# Patient Record
Sex: FEMALE | Race: WHITE | NOT HISPANIC OR LATINO | Employment: FULL TIME | URBAN - METROPOLITAN AREA
[De-identification: names, ages, dates, MRNs, and addresses within clinical notes are randomized per-mention and may not be internally consistent; named-entity substitution may affect disease eponyms.]

---

## 2023-07-21 ENCOUNTER — LAB (OUTPATIENT)
Dept: LAB | Facility: LAB | Age: 28
End: 2023-07-21
Payer: COMMERCIAL

## 2023-07-21 LAB — TOBACCO SCREEN, URINE: NEGATIVE

## 2023-11-17 ENCOUNTER — PHARMACY VISIT (OUTPATIENT)
Dept: PHARMACY | Facility: CLINIC | Age: 28
End: 2023-11-17
Payer: COMMERCIAL

## 2023-11-17 PROCEDURE — RXMED WILLOW AMBULATORY MEDICATION CHARGE

## 2023-11-17 RX ORDER — SPIRONOLACTONE 100 MG/1
100 TABLET, FILM COATED ORAL
Qty: 90 TABLET | Refills: 0 | OUTPATIENT
Start: 2022-12-09 | End: 2024-02-12 | Stop reason: SINTOL

## 2024-02-12 ENCOUNTER — OFFICE VISIT (OUTPATIENT)
Dept: PRIMARY CARE | Facility: CLINIC | Age: 29
End: 2024-02-12
Payer: COMMERCIAL

## 2024-02-12 VITALS
HEART RATE: 88 BPM | WEIGHT: 240.7 LBS | DIASTOLIC BLOOD PRESSURE: 88 MMHG | BODY MASS INDEX: 35.65 KG/M2 | SYSTOLIC BLOOD PRESSURE: 138 MMHG | TEMPERATURE: 98.2 F | OXYGEN SATURATION: 96 % | HEIGHT: 69 IN

## 2024-02-12 DIAGNOSIS — R06.81 APNEA: ICD-10-CM

## 2024-02-12 DIAGNOSIS — G47.10 HYPERSOMNOLENCE: ICD-10-CM

## 2024-02-12 DIAGNOSIS — E88.819 INSULIN RESISTANCE: ICD-10-CM

## 2024-02-12 DIAGNOSIS — Z00.00 WELL ADULT EXAM: Primary | ICD-10-CM

## 2024-02-12 DIAGNOSIS — E66.9 CLASS 2 OBESITY WITHOUT SERIOUS COMORBIDITY WITH BODY MASS INDEX (BMI) OF 35.0 TO 35.9 IN ADULT, UNSPECIFIED OBESITY TYPE: ICD-10-CM

## 2024-02-12 PROCEDURE — 3008F BODY MASS INDEX DOCD: CPT | Performed by: FAMILY MEDICINE

## 2024-02-12 PROCEDURE — 99203 OFFICE O/P NEW LOW 30 MIN: CPT | Performed by: FAMILY MEDICINE

## 2024-02-12 PROCEDURE — 1036F TOBACCO NON-USER: CPT | Performed by: FAMILY MEDICINE

## 2024-02-12 ASSESSMENT — PATIENT HEALTH QUESTIONNAIRE - PHQ9
SUM OF ALL RESPONSES TO PHQ9 QUESTIONS 1 AND 2: 0
2. FEELING DOWN, DEPRESSED OR HOPELESS: NOT AT ALL
1. LITTLE INTEREST OR PLEASURE IN DOING THINGS: NOT AT ALL

## 2024-02-12 NOTE — PROGRESS NOTES
"Subjective   Patient ID: Lita Oconnor is a 28 y.o. female who presents for New Patient Visit.    HPI     Patient states that she would like to establish care.     She also states she has a few general questions regarding health.    She would like a referral to see a dietician.     Pt was seen by GYN and started on Metformin and spironolactone for suspected PCOS and androgen excess.    Pt has been expeiencing some daytime hypersomnia.  (+) snoring.  (+) witnessed apnea at times.    Review of Systems  Constitutional: Patient denies any fever, chills, loss of appetite, or unexplained weight loss.  Cardiovascular: Patient denies any chest pain, shortness of breath with exertion, tachycardia, palpitations, orthopnea, or paroxysmal nocturnal dyspnea.  Respiratory: Patient denies any cough, shortness breath, or wheezing.  Gastrointestinal: Patient denies any nausea, vomiting, diarrhea, constipation, melena, hematochezia, or reflux symptoms.  Skin: Denies any rashes or skin lesions.   Neurology: Patient denies any new motor or sensory losses.  Denies any numbness, tingling, weakness, and incoordination of the extremities.  Patient also denies any tremor, seizures, or gait instability.  Endocrinology: Denies any polyuria, polydipsia, polyphagia, or heat/cold intolerance.      Objective   /88   Pulse 88   Temp 36.8 °C (98.2 °F)   Ht 1.753 m (5' 9\")   Wt 109 kg (240 lb 11.2 oz)   SpO2 96%   BMI 35.55 kg/m²     Physical Exam  General Appearance: Alert and cooperative, in no acute distress, well-developed/well-nourished, overweight female.    Neck: Supple and without adenopathy or rigidity.  There is no JVD at 90° and no carotid bruits are noted.  There is no thyromegaly, thyroid tenderness, or palpable thyroid nodules.  Heart: Regular rate and rhythm without murmur or ectopy.  Respiratory: Lungs are clear to auscultation bilaterally with good air exchange.  Good respiratory effort and no accessory muscle " use.  Skin: Good turgor, moist, warm and without rashes or lesions.  Neurological exam: Alert and oriented ×3, no tremor, normal gait.  Extremities: No clubbing, cyanosis, or edema    ENT: Mucous membranes are moist, external auditory canals and tympanic membranes are within normal limits bilaterally.  Pharynx is without erythema or exudate.  Pt has large tonsils on exam.    Assessment/Plan   1. Well adult exam  Appropriate screenings for the patient's current age were discussed at length.  I encouraged a low-fat/low-cholesterol diet and routine exercise.  - CBC and Auto Differential; Future  - Comprehensive Metabolic Panel; Future  - Lipid Panel; Future    2. Hypersomnolence  Sleep study ordered to further evaluate.  - In-Center Sleep Study (Non-Sleep Provider Only); Future    3. Apnea  Sleep study ordered to further evaluate.  High suspicion for sleep apnea given her symptoms and large tonsils.  - In-Center Sleep Study (Non-Sleep Provider Only); Future    4. Insulin resistance  Labs were ordered to further evaluate for insulin resistance.  - Hemoglobin A1C; Future  - TSH with reflex to Free T4 if abnormal; Future    5. Class 2 obesity without serious comorbidity with body mass index (BMI) of 35.0 to 35.9 in adult, unspecified obesity type  We will refer her to nutrition services.  We will also check a thyroid level for her.  - Referral to Nutrition Services; Future  - TSH with reflex to Free T4 if abnormal; Future           Orders Placed This Encounter   Procedures    CBC and Auto Differential    Comprehensive Metabolic Panel    Hemoglobin A1C    Lipid Panel    TSH with reflex to Free T4 if abnormal    Referral to Nutrition Services    In-Center Sleep Study (Non-Sleep Provider Only)

## 2024-02-12 NOTE — PATIENT INSTRUCTIONS
Follow up in 12 months.    It was a pleasure to see you today. Thank you for choosing us for your health care needs.    If you have lab or other testing completed and have not been informed of results within one week, please call the office for your results.    If you haven't done so, consider signing up for [facility] [employer], the [facility] personal health record. Ask the staff how you can get started.

## 2024-03-13 ENCOUNTER — NUTRITION (OUTPATIENT)
Dept: PRIMARY CARE | Facility: CLINIC | Age: 29
End: 2024-03-13
Payer: COMMERCIAL

## 2024-03-13 DIAGNOSIS — E66.9 CLASS 2 OBESITY WITHOUT SERIOUS COMORBIDITY WITH BODY MASS INDEX (BMI) OF 35.0 TO 35.9 IN ADULT, UNSPECIFIED OBESITY TYPE: Primary | ICD-10-CM

## 2024-03-13 PROBLEM — E66.812 CLASS 2 OBESITY WITHOUT SERIOUS COMORBIDITY WITH BODY MASS INDEX (BMI) OF 35.0 TO 35.9 IN ADULT: Status: ACTIVE | Noted: 2024-03-13

## 2024-03-13 PROCEDURE — 97802 MEDICAL NUTRITION INDIV IN: CPT | Performed by: DIETITIAN, REGISTERED

## 2024-03-13 NOTE — PROGRESS NOTES
Reason for Nutrition Visit:  Pt is a 28 y.o. female being seen at [facility] in [place] referred for   1. Class 2 obesity without serious comorbidity with body mass index (BMI) of 35.0 to 35.9 in adult, unspecified obesity type           Past Medical History:  Patient Active Problem List   Diagnosis    Class 2 obesity without serious comorbidity with body mass index (BMI) of 35.0 to 35.9 in adult      Weight history:  240# (2/2024)  237# (12/2022)    Significant Weight Change: No    Lab Results   Component Value Date    HGBA1C 5.6 11/30/2022      Food and Nutrition History:  She is here to discuss healthier eating.   She has possible PCOS, but not able to be confirmed because she has a birth control implant that influences her menstrual cycle. Is on metformin.   She works for [employer]. Works 12 hour shifts from 7a to 7p, 1 hour commute, [employer] respiratory therapy.    Typical Food Intake:   Breakfast:  6 a.m., in the car on workdays, a little later if not working. Coffee with [employer]ani creamer. Kodiac waffles with PB or yogurt, or sometimes a breakfast sandwich from [employer], or [employer]. Sometimes fruit (grapes and apples). Feels mildly hungry in the morning.   Morning Snack: 11, such as food available at work (baked goods), has been packing her own snacks in the past 2 weeks: produce or nuts, or crackers & cheese. At home, no snacking in the morning.   Lunch: 1 pm or after - Mediterranean food from work - power bowls. Or brings leftovers from home. Typically eats lunch, doesn't typically miss it.   Afternoon Snack: sometimes has a snack, especially if she didn't eat her morning snack. At home, no afternoon snack.    Dinner: 8:45-9 pm on workdays, likes to eat with her boyfriend. Dines out 1-2 nights per week. Makes salmon (weekly or every other week), or pot roast in crock pot, or chicken caesar wraps.   Beverages: coffee. No pop. Drinks hot & cold tea. Occasionally lemonade. EtOH - socially.     Food Preparation:  Patient and Partner/Spouse  Cooking Skills/Barriers: Low preference for cooking, her boyfriend likes to cook more than her.   Grocery Shopping: Patient, shops for about 10 days of groceries, then is out for about 4 days (more prone to dining out at that time).   No difficulty affording food  Dining out: Restaurant     Food Allergies: None  Food Intolerance: Italian sausage sometimes  GI Symptoms : None   Swallowing Difficulty: No problems with swallowing  Chewing: no problems chewing    Exercise:   Types of Activities: started going to the gym in the past month and is seeing a . Previously, was walking outside. Goes to the gym 2-3 times per week. Uses machines, and treadmill interval program, and abdominal exercises. Likes the program, feels that it is sustainable.   Did competative cheerleading when she was younger.   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     Dietary Supplements: Denies     Relationship with Food:   Appetite: Good    Nutrition Focused Physical Exam:  Performed/Deferred: Deferred as pt visually appears well-nourished with no signs of malnutrition    Estimated Nutrient Needs:    Weight Maintanence: 2384 kcal/day  Weight Loss Needs: 2717-1665 kcal/day,  gm protein/day, 0359-1324 ml/day    Nutrition Diagnosis:    Diagnosis Statement 1:  Diagnosis Status: New  Diagnosis : Food and nutrition related knowledge deficit related to lack of or limited prior nutrition-related education as evidenced by  she has questions about how to make changes to her diet for healthier eating.     Nutrition Interventions:  Via teach back method patient verbalized understanding of the following topics:  Nutrition education on the following diet topic(s): Plate Method, Pairing Food Groups, and Timing of Meals  Nutrition counseling using the following strategy: Nutrition Counseling: Goal Setting  Coordination of Care: None    Education:   Encouraged patient to  pursue balanced eating.   Eat the first meal of the day within about 2 hours of waking up. If that seems too early, then be attentive to the first time of the day that there are signs of hunger appearing, and respond to those hunger signals by eating a meal or snack.   Eat food at least every 5 hours. If it will be longer than 5 hours between meals, consider adding a snack between the meals. Eating at regular intervals can help prevent becoming overly hungry.   Use the Plate Method to balance the type of food and amount of foods on the plate.   At snacks, include a food that is a rich source of protein, and include a food from a second food group. Eating food from 2 food groups at a snack can promote more satisfaction than eating a snack that is very small. A snack should be satisfying and help diminish thoughts of hunger until the next meal.   Spend some time each week planning out what to eat, shop for groceries, and do a little food preparation. Try to create meals that will yield leftovers to save time at another meal. Planning can be one of the most helpful skills for promoting balanced eating.    Strive to make pleasure part of eating healthy. Also strive for a good relationship with food. This shouldn't be a temporary diet that can only be maintained with strict willpower. Be curious about what tastes good to you and what foods feel good in your body. No food is inherently good or bad, and eating shouldn't promote negative feelings about food. Be kind to yourself as you work on balanced eating.    Utilize the Plate Method at meals in order to balance meals.   - 1/3-1/2 of the plate full of non-starchy vegetables. These foods contribute very little carbohydrate to the plate, and they add fiber to the meal. Salad, carrots, bell peppers, zucchini, broccoli, cauliflower, asparagus, radishes, carrots and green beans are a few examples of these foods. They can be served cooked or raw.    - 1/4-1/3 of the plate  "including protein foods. Examples can include meat, nuts, seeds, cheese, cottage cheese, nut butter, fish, seafood, tofu, and edamame.     - 1/4-1/3 of the plate including carbohydrate foods. These foods include grains, fruit, legumes, starchy vegetables, milk and yogurt. Aim to eat at least half of the daily grains as whole grains.    Introduced patient to using a scale of 1-10 to rate hunger/satiety. Reviewed signs of hunger that patient might or might not feel, and encouraged being attentive to notice these signals if they are present. Encouraged patient to honor hunger by eating when feeling at a \"3\" instead of waiting for hunger to become more severe. Encouraged using this method in conjunction with balanced foods on the plate, using the Plate Method.     Advised there is no dietary treatment for PCOS, but eating complex carbohydrates, meals at regular intervals through the day, balanced plates of food, and ideally eating foods more similar to a Mediterranean-style eating pattern are some ways that nutrition can be optimized with PCOS.     Monitoring & Evaluation:  Monitoring & Evaluation: Amount of Food - Estimated, Types of Food, Meal/Snack Pattern - Estimated, and Physical Activity    Nutrition Goals:  She will consume a morning snack and afternoon snack in order to keep portions at lunch & dinner to 1 plate  She will use the Plate Method at meals to balance types & amounts of food  She will keep up her exercise habit      Handouts:  ADA Plate Method, 33% Plate Method, [facility] Balanced Breakfast, [facility] Love Your Lunch, High Protein Snack Ideas, and High Protein Meal Ideas    Follow up Plan:   PRN, patient will call to schedule follow up if desired, suggested 1-2 months if she would like to meet again    Readiness to Change : Good  Level of Understanding : Good  Anticipated Compliant : Good   "

## 2024-03-13 NOTE — PATIENT INSTRUCTIONS
Encouraged patient to pursue balanced eating.   Eat the first meal of the day within about 2 hours of waking up. If that seems too early, then be attentive to the first time of the day that there are signs of hunger appearing, and respond to those hunger signals by eating a meal or snack.   Eat food at least every 5 hours. If it will be longer than 5 hours between meals, consider adding a snack between the meals. Eating at regular intervals can help prevent becoming overly hungry.   Use the Plate Method to balance the type of food and amount of foods on the plate.   At snacks, include a food that is a rich source of protein, and include a food from a second food group. Eating food from 2 food groups at a snack can promote more satisfaction than eating a snack that is very small. A snack should be satisfying and help diminish thoughts of hunger until the next meal.   Spend some time each week planning out what to eat, shop for groceries, and do a little food preparation. Try to create meals that will yield leftovers to save time at another meal. Planning can be one of the most helpful skills for promoting balanced eating.    Strive to make pleasure part of eating healthy. Also strive for a good relationship with food. This shouldn't be a temporary diet that can only be maintained with strict willpower. Be curious about what tastes good to you and what foods feel good in your body. No food is inherently good or bad, and eating shouldn't promote negative feelings about food. Be kind to yourself as you work on balanced eating.    Utilize the Plate Method at meals in order to balance meals.   - 1/3-1/2 of the plate full of non-starchy vegetables. These foods contribute very little carbohydrate to the plate, and they add fiber to the meal. Salad, carrots, bell peppers, zucchini, broccoli, cauliflower, asparagus, radishes, carrots and green beans are a few examples of these foods. They can be served cooked or raw.    -  "1/4-1/3 of the plate including protein foods. Examples can include meat, nuts, seeds, cheese, cottage cheese, nut butter, fish, seafood, tofu, and edamame.     - 1/4-1/3 of the plate including carbohydrate foods. These foods include grains, fruit, legumes, starchy vegetables, milk and yogurt. Aim to eat at least half of the daily grains as whole grains.    Introduced patient to using a scale of 1-10 to rate hunger/satiety. Reviewed signs of hunger that patient might or might not feel, and encouraged being attentive to notice these signals if they are present. Encouraged patient to honor hunger by eating when feeling at a \"3\" instead of waiting for hunger to become more severe. Encouraged using this method in conjunction with balanced foods on the plate, using the Plate Method.     Advised there is no dietary treatment for PCOS, but eating complex carbohydrates, meals at regular intervals through the day, balanced plates of food, and ideally eating foods more similar to a Mediterranean-style eating pattern are some ways that nutrition can be optimized with PCOS.     "

## 2024-03-19 ENCOUNTER — APPOINTMENT (OUTPATIENT)
Dept: PRIMARY CARE | Facility: CLINIC | Age: 29
End: 2024-03-19
Payer: COMMERCIAL

## 2024-05-07 ENCOUNTER — OFFICE VISIT (OUTPATIENT)
Dept: DERMATOLOGY | Facility: CLINIC | Age: 29
End: 2024-05-07
Payer: COMMERCIAL

## 2024-05-07 DIAGNOSIS — D22.9 MULTIPLE BENIGN MELANOCYTIC NEVI: Primary | ICD-10-CM

## 2024-05-07 DIAGNOSIS — L57.8 SUN-DAMAGED SKIN: ICD-10-CM

## 2024-05-07 DIAGNOSIS — L90.5 SCAR CONDITIONS AND FIBROSIS OF SKIN: ICD-10-CM

## 2024-05-07 PROCEDURE — 3008F BODY MASS INDEX DOCD: CPT | Performed by: DERMATOLOGY

## 2024-05-07 PROCEDURE — 99203 OFFICE O/P NEW LOW 30 MIN: CPT | Performed by: DERMATOLOGY

## 2024-05-07 ASSESSMENT — DERMATOLOGY QUALITY OF LIFE (QOL) ASSESSMENT
RATE HOW BOTHERED YOU ARE BY SYMPTOMS OF YOUR SKIN PROBLEM (EG, ITCHING, STINGING BURNING, HURTING OR SKIN IRRITATION): 0 - NEVER BOTHERED
DATE THE QUALITY-OF-LIFE ASSESSMENT WAS COMPLETED: 66967
ARE THERE EXCLUSIONS OR EXCEPTIONS FOR THE QUALITY OF LIFE ASSESSMENT: NO
RATE HOW BOTHERED YOU ARE BY EFFECTS OF YOUR SKIN PROBLEMS ON YOUR ACTIVITIES (EG, GOING OUT, ACCOMPLISHING WHAT YOU WANT, WORK ACTIVITIES OR YOUR RELATIONSHIPS WITH OTHERS): 0 - NEVER BOTHERED
RATE HOW EMOTIONALLY BOTHERED YOU ARE BY YOUR SKIN PROBLEM (FOR EXAMPLE, WORRY, EMBARRASSMENT, FRUSTRATION): 0 - NEVER BOTHERED

## 2024-05-07 ASSESSMENT — DERMATOLOGY PATIENT ASSESSMENT
HAVE YOU HAD OR DO YOU HAVE A STAPH INFECTION: NO
DO YOU HAVE ANY NEW OR CHANGING LESIONS: NO
DO YOU HAVE IRREGULAR MENSTRUAL CYCLES: NO
ARE YOU TRYING TO GET PREGNANT: NO
DO YOU USE A TANNING BED: YES, PREVIOUSLY
HAVE YOU HAD OR DO YOU HAVE VASCULAR DISEASE: NO
ARE YOU AN ORGAN TRANSPLANT RECIPIENT: NO
ARE YOU ON BIRTH CONTROL: YES
DO YOU USE SUNSCREEN: DAILY

## 2024-05-07 ASSESSMENT — PATIENT GLOBAL ASSESSMENT (PGA): PATIENT GLOBAL ASSESSMENT: PATIENT GLOBAL ASSESSMENT:  1 - CLEAR

## 2024-05-07 ASSESSMENT — ITCH NUMERIC RATING SCALE: HOW SEVERE IS YOUR ITCHING?: 0

## 2024-05-07 NOTE — PROGRESS NOTES
Subjective     Lita Oconnor is a 28 y.o. female who presents for the following: Skin Check (Redness, tenderness of nose for past month. She did get sunburn in [place]. /No personal or family history of skin cancer.  She reports having a lot of moles.).     Skin Cancer History  No skin cancer on file.      Review of Systems:  No other skin or systemic complaints other than what is documented elsewhere in the note.    The following portions of the chart were reviewed this encounter and updated as appropriate:       Specialty Problems    None    Past Medical History:  Lita Oconnor  has no past medical history on file.    Past Surgical History:  Lita Oconnor  has a past surgical history that includes Other surgical history (Left, 2013).    Family History:  Patient family history includes Coronary artery disease in her father and paternal grandfather; Diabetes in her father; Heart attack in her paternal grandfather; Heart disease in her maternal grandfather; No Known Problems in her mother; Thyroid issues in her father.    Social History:  Lita Oconnor  reports that she has never smoked. She has never used smokeless tobacco. She reports current alcohol use. She reports that she does not use drugs.    Allergies:  Spironolactone    Current Medications / CAM's:    Current Outpatient Medications:     etonogestrel (NEXPLANON SDRM), Inject under the skin., Disp: , Rfl:     metFORMIN XR (Glucophage-XR) 500 mg 24 hr tablet, Take 2 tablets (1,000 mg) by mouth once daily., Disp: 60 tablet, Rfl: 5     Objective   Well appearing patient in no apparent distress; mood and affect are within normal limits.    A full examination was performed including scalp, head, eyes, ears, nose, lips, neck, chest, axillae, abdomen, back, buttocks, bilateral upper extremities, bilateral lower extremities, hands, feet, fingers, toes, fingernails, and toenails. All findings within normal limits unless otherwise noted below. Patient  declined genital and gluteal cleft exam.  Patient has fingernail polish in place.      - scattered regular brown macules and papules    - Well healed scar at prior treatment sites without visual or palpable evidence of recurrence.     - scattered tan macules, telangiectasias, and general photo-damage         Assessment/Plan   Multiple benign melanocytic nevi    Benign melanocytic nevi  - Discussed benign nature and that no treatment is necessary unless it becomes painful or increases in size. Patient opts for clinical monitoring at this time.    - Sun protective behavior reviewed and encouraged including the use of over-the-counter sunscreen with SPF30+ daily (reapply every 1.5 hours when outdoors), UPF clothing, broad rimmed hats, sunglasses, and avoidance of midday sun. Home skin monitoring encouraged and how to monitor for skin cancer (changing or new moles, new rapidly growing or non-healing lesions) reviewed. Patient encouraged to call with interval concerns or changes.      Scar conditions and fibrosis of skin    - Well healed scar(s) at sites of prior left shoulder surgery   - Sun protective behavior reviewed and encouraged including the use of over-the-counter sunscreen with SPF30+ daily (reapply every 1.5 hours when outdoors), UPF clothing, broad rimmed hats, sunglasses, and avoidance of midday sun. Home skin monitoring encouraged and how to monitor for skin cancer (changing or new moles, new rapidly growing or non-healing lesions) reviewed. Patient encouraged to call with interval concerns or changes.       Sun-damaged skin    Actinically damaged skin- mild telangiectasias and prominent pores on nose as site of prior sunburn with sensitivity, no signs of malignancy on visual or palpation exam  Tan lines on back.   - Sun protective behavior reviewed and encouraged including the use of over-the-counter sunscreen with SPF30+ daily (reapply every 1.5 hours when outdoors), UPF clothing, broad rimmed hats,  sunglasses, and avoidance of midday sun. Home skin monitoring encouraged and how to monitor for skin cancer (changing or new moles, new rapidly growing or non-healing lesions) reviewed. Patient encouraged to call with interval concerns or changes.         Given her numerous moles > 100, but NOT clinically dysplastic I recommend FBSE every 2-3 years, sooner with changes/concerns.     Kay Jaime MD

## 2024-06-05 PROBLEM — Z97.5 NEXPLANON IN PLACE: Status: ACTIVE | Noted: 2020-02-24

## 2024-06-05 PROBLEM — L30.9 DERMATITIS: Status: ACTIVE | Noted: 2024-06-05

## 2024-08-02 ENCOUNTER — LAB (OUTPATIENT)
Dept: LAB | Facility: LAB | Age: 29
End: 2024-08-02
Payer: COMMERCIAL

## 2024-08-02 DIAGNOSIS — Z00.00 WELL ADULT EXAM: ICD-10-CM

## 2024-08-02 DIAGNOSIS — E88.819 INSULIN RESISTANCE: ICD-10-CM

## 2024-08-02 DIAGNOSIS — E66.9 CLASS 2 OBESITY WITHOUT SERIOUS COMORBIDITY WITH BODY MASS INDEX (BMI) OF 35.0 TO 35.9 IN ADULT, UNSPECIFIED OBESITY TYPE: ICD-10-CM

## 2024-08-02 LAB
ALBUMIN SERPL BCP-MCNC: 4.5 G/DL (ref 3.4–5)
ALP SERPL-CCNC: 64 U/L (ref 33–110)
ALT SERPL W P-5'-P-CCNC: 15 U/L (ref 7–45)
ANION GAP SERPL CALC-SCNC: 12 MMOL/L (ref 10–20)
AST SERPL W P-5'-P-CCNC: 12 U/L (ref 9–39)
BASOPHILS # BLD AUTO: 0.06 X10*3/UL (ref 0–0.1)
BASOPHILS NFR BLD AUTO: 0.7 %
BILIRUB SERPL-MCNC: 0.6 MG/DL (ref 0–1.2)
BUN SERPL-MCNC: 13 MG/DL (ref 6–23)
CALCIUM SERPL-MCNC: 9.3 MG/DL (ref 8.6–10.3)
CHLORIDE SERPL-SCNC: 105 MMOL/L (ref 98–107)
CHOLEST SERPL-MCNC: 201 MG/DL (ref 0–199)
CHOLESTEROL/HDL RATIO: 4.4
CO2 SERPL-SCNC: 25 MMOL/L (ref 21–32)
CREAT SERPL-MCNC: 0.82 MG/DL (ref 0.5–1.05)
EGFRCR SERPLBLD CKD-EPI 2021: >90 ML/MIN/1.73M*2
EOSINOPHIL # BLD AUTO: 0.27 X10*3/UL (ref 0–0.7)
EOSINOPHIL NFR BLD AUTO: 3 %
ERYTHROCYTE [DISTWIDTH] IN BLOOD BY AUTOMATED COUNT: 12.7 % (ref 11.5–14.5)
GLUCOSE SERPL-MCNC: 85 MG/DL (ref 74–99)
HCT VFR BLD AUTO: 42.1 % (ref 36–46)
HDLC SERPL-MCNC: 45.3 MG/DL
HGB BLD-MCNC: 13.7 G/DL (ref 12–16)
IMM GRANULOCYTES # BLD AUTO: 0.03 X10*3/UL (ref 0–0.7)
IMM GRANULOCYTES NFR BLD AUTO: 0.3 % (ref 0–0.9)
LDLC SERPL CALC-MCNC: 134 MG/DL
LYMPHOCYTES # BLD AUTO: 2.71 X10*3/UL (ref 1.2–4.8)
LYMPHOCYTES NFR BLD AUTO: 29.7 %
MCH RBC QN AUTO: 30 PG (ref 26–34)
MCHC RBC AUTO-ENTMCNC: 32.5 G/DL (ref 32–36)
MCV RBC AUTO: 92 FL (ref 80–100)
MONOCYTES # BLD AUTO: 0.72 X10*3/UL (ref 0.1–1)
MONOCYTES NFR BLD AUTO: 7.9 %
NEUTROPHILS # BLD AUTO: 5.34 X10*3/UL (ref 1.2–7.7)
NEUTROPHILS NFR BLD AUTO: 58.4 %
NON HDL CHOLESTEROL: 156 MG/DL (ref 0–149)
NRBC BLD-RTO: 0 /100 WBCS (ref 0–0)
PLATELET # BLD AUTO: 352 X10*3/UL (ref 150–450)
POTASSIUM SERPL-SCNC: 4.3 MMOL/L (ref 3.5–5.3)
PROT SERPL-MCNC: 7 G/DL (ref 6.4–8.2)
RBC # BLD AUTO: 4.56 X10*6/UL (ref 4–5.2)
SODIUM SERPL-SCNC: 138 MMOL/L (ref 136–145)
TRIGL SERPL-MCNC: 107 MG/DL (ref 0–149)
TSH SERPL-ACNC: 0.99 MIU/L (ref 0.44–3.98)
VLDL: 21 MG/DL (ref 0–40)
WBC # BLD AUTO: 9.1 X10*3/UL (ref 4.4–11.3)

## 2024-08-02 PROCEDURE — 84443 ASSAY THYROID STIM HORMONE: CPT

## 2024-08-02 PROCEDURE — 85025 COMPLETE CBC W/AUTO DIFF WBC: CPT

## 2024-08-02 PROCEDURE — 83036 HEMOGLOBIN GLYCOSYLATED A1C: CPT

## 2024-08-02 PROCEDURE — 36415 COLL VENOUS BLD VENIPUNCTURE: CPT

## 2024-08-02 PROCEDURE — 80061 LIPID PANEL: CPT

## 2024-08-02 PROCEDURE — 80053 COMPREHEN METABOLIC PANEL: CPT

## 2024-08-03 LAB
EST. AVERAGE GLUCOSE BLD GHB EST-MCNC: 111 MG/DL
HBA1C MFR BLD: 5.5 %

## 2024-08-07 ENCOUNTER — TELEPHONE (OUTPATIENT)
Dept: PRIMARY CARE | Facility: CLINIC | Age: 29
End: 2024-08-07
Payer: COMMERCIAL

## 2024-08-17 ENCOUNTER — APPOINTMENT (OUTPATIENT)
Dept: PRIMARY CARE | Facility: CLINIC | Age: 29
End: 2024-08-17
Payer: COMMERCIAL

## 2024-08-24 ENCOUNTER — APPOINTMENT (OUTPATIENT)
Dept: PRIMARY CARE | Facility: CLINIC | Age: 29
End: 2024-08-24
Payer: COMMERCIAL

## 2024-08-24 VITALS
HEIGHT: 68 IN | OXYGEN SATURATION: 96 % | TEMPERATURE: 97.5 F | HEART RATE: 91 BPM | BODY MASS INDEX: 35.95 KG/M2 | WEIGHT: 237.2 LBS | DIASTOLIC BLOOD PRESSURE: 94 MMHG | SYSTOLIC BLOOD PRESSURE: 127 MMHG

## 2024-08-24 DIAGNOSIS — E66.9 CLASS 2 OBESITY WITHOUT SERIOUS COMORBIDITY WITH BODY MASS INDEX (BMI) OF 36.0 TO 36.9 IN ADULT, UNSPECIFIED OBESITY TYPE: Primary | ICD-10-CM

## 2024-08-24 PROBLEM — E88.819 INSULIN RESISTANCE: Status: ACTIVE | Noted: 2024-08-24

## 2024-08-24 PROBLEM — R63.5 WEIGHT GAIN: Status: ACTIVE | Noted: 2024-08-24

## 2024-08-24 PROBLEM — L68.0 FEMALE HIRSUTISM: Status: ACTIVE | Noted: 2024-08-24

## 2024-08-24 PROCEDURE — 3008F BODY MASS INDEX DOCD: CPT | Performed by: FAMILY MEDICINE

## 2024-08-24 PROCEDURE — 99214 OFFICE O/P EST MOD 30 MIN: CPT | Performed by: FAMILY MEDICINE

## 2024-08-24 RX ORDER — TIRZEPATIDE 2.5 MG/.5ML
2.5 INJECTION, SOLUTION SUBCUTANEOUS
Qty: 2 ML | Refills: 0 | Status: SHIPPED | OUTPATIENT
Start: 2024-08-25

## 2024-08-24 ASSESSMENT — PATIENT HEALTH QUESTIONNAIRE - PHQ9
SUM OF ALL RESPONSES TO PHQ9 QUESTIONS 1 AND 2: 0
1. LITTLE INTEREST OR PLEASURE IN DOING THINGS: NOT AT ALL
2. FEELING DOWN, DEPRESSED OR HOPELESS: NOT AT ALL

## 2024-08-24 NOTE — PROGRESS NOTES
"Subjective   Patient ID: Lita Oconnor is a 29 y.o. female who presents for Follow-up.    HPI     Patient states she would like to discuss weight loss options.  Pt has had difficulty achieving any meaningful long-term weight loss despite significant efforts at exercise and multiple different diets including caloric reduction.    No other concerns    Review of Systems  Constitutional: Patient denies any fever, chills, loss of appetite, or unexplained weight loss.  Cardiovascular: Patient denies any chest pain, shortness of breath with exertion, tachycardia, palpitations, orthopnea, or paroxysmal nocturnal dyspnea.  Respiratory: Patient denies any cough, shortness breath, or wheezing.  Gastrointestinal: Patient denies any nausea, vomiting, diarrhea, constipation, melena, hematochezia, or reflux symptoms.  Skin: Denies any rashes or skin lesions.   Neurology: Patient denies any new motor or sensory losses.  Denies any numbness, tingling, weakness, and incoordination of the extremities.  Patient also denies any tremor, seizures, or gait instability.  Endocrinology: Denies any polyuria, polydipsia, polyphagia, or heat/cold intolerance.    Objective   BP (!) 127/94   Pulse 91   Temp 36.4 °C (97.5 °F)   Ht 1.727 m (5' 8\")   Wt 108 kg (237 lb 3.2 oz)   SpO2 96%   BMI 36.07 kg/m²     Physical Exam  Gen. appearance: Alert and cooperative, no acute distress, well-developed/well-nourished, overweight female.  Neck: Supple and without adenopathy or rigidity.  There is no JVD at 90° and no carotid bruits are noted.  Cardiovascular: Heart has a regular rate and rhythm without murmur or ectopy.  Respiratory: Lungs are clear to auscultation bilaterally with good air exchange.  Good respiratory effort and no accessory muscle use.    Assessment/Plan   Assessment & Plan  Class 2 obesity without serious comorbidity with body mass index (BMI) of 36.0 to 36.9 in adult, unspecified obesity type  We had an extensive discussion " regarding available weight loss medications.   Risks, benefits, and side effects of the medication were discussed at length.  Questions were answered to the patient's satisfaction the patient wishes to proceed with treatment with Mounjaro if her insurance covers the medication.  We also discussed using local compounding pharmacy for subcutaneous semaglutide if the Mounjaro is not covered.    Orders:    tirzepatide (Mounjaro) 2.5 mg/0.5 mL pen injector; Inject 2.5 mg under the skin 1 (one) time per week.        Total time spent caring for the patient today was 34 minutes.  This includes time reviewing the chart, examining the patient, discussion with the patient, and documentation.

## 2024-08-24 NOTE — PATIENT INSTRUCTIONS
Rx for mounjaro was sent for you.    We can proceed with using the compounding pharmacy if the insurance does not cover the Mounjaro.    Follow up in 4 weeks to recheck the weight if we are able to start the medication.      It was a pleasure to see you today. Thank you for choosing us for your health care needs.    If you have lab or other testing completed and have not been informed of results within one week, please call the office for your results.    If you haven't done so, consider signing up for [facility] [employer], the [facility] personal health record. Ask the staff how you can get started.

## 2024-09-03 ENCOUNTER — TELEPHONE (OUTPATIENT)
Dept: PRIMARY CARE | Facility: CLINIC | Age: 29
End: 2024-09-03
Payer: COMMERCIAL

## 2024-09-03 NOTE — TELEPHONE ENCOUNTER
RX will be faxed. Form completed.    Pt should schedule a follow up appt in 4 weeks for follow up on the weight and medication use.

## 2024-09-03 NOTE — TELEPHONE ENCOUNTER
Pt advised that it was denied per insurance per diagnosis. I did tell her that she will need to see what other weight loss medication is approved by her insurance per Dr. Abraham request.     She did mention you suggest compound pharmacy as well: [facility] she stated that it is fine she'll go through them and pay cash.

## 2024-09-03 NOTE — TELEPHONE ENCOUNTER
Called pharmacy unfortunately her insurance will not cover it due to diagnosis not being diabetes.

## 2024-09-04 NOTE — ASSESSMENT & PLAN NOTE
We had an extensive discussion regarding available weight loss medications.   Risks, benefits, and side effects of the medication were discussed at length.  Questions were answered to the patient's satisfaction the patient wishes to proceed with treatment with Mounjaro if her insurance covers the medication.  We also discussed using local compounding pharmacy for subcutaneous semaglutide if the Mounjaro is not covered.    Orders:    tirzepatide (Mounjaro) 2.5 mg/0.5 mL pen injector; Inject 2.5 mg under the skin 1 (one) time per week.

## 2024-09-25 ENCOUNTER — APPOINTMENT (OUTPATIENT)
Dept: PRIMARY CARE | Facility: CLINIC | Age: 29
End: 2024-09-25
Payer: COMMERCIAL

## 2024-09-25 ENCOUNTER — APPOINTMENT (OUTPATIENT)
Dept: OBSTETRICS AND GYNECOLOGY | Facility: CLINIC | Age: 29
End: 2024-09-25
Payer: COMMERCIAL

## 2024-09-25 VITALS
HEIGHT: 68 IN | DIASTOLIC BLOOD PRESSURE: 72 MMHG | SYSTOLIC BLOOD PRESSURE: 108 MMHG | BODY MASS INDEX: 35.68 KG/M2 | WEIGHT: 235.4 LBS

## 2024-09-25 VITALS
BODY MASS INDEX: 35.77 KG/M2 | OXYGEN SATURATION: 97 % | HEART RATE: 87 BPM | SYSTOLIC BLOOD PRESSURE: 102 MMHG | RESPIRATION RATE: 16 BRPM | DIASTOLIC BLOOD PRESSURE: 58 MMHG | TEMPERATURE: 98 F | HEIGHT: 68 IN | WEIGHT: 236 LBS

## 2024-09-25 DIAGNOSIS — Z01.419 ENCNTR FOR GYN EXAM (GENERAL) (ROUTINE) W/O ABN FINDINGS: Primary | ICD-10-CM

## 2024-09-25 DIAGNOSIS — E66.812 CLASS 2 OBESITY WITHOUT SERIOUS COMORBIDITY WITH BODY MASS INDEX (BMI) OF 35.0 TO 35.9 IN ADULT, UNSPECIFIED OBESITY TYPE: Primary | ICD-10-CM

## 2024-09-25 DIAGNOSIS — E66.9 CLASS 2 OBESITY WITHOUT SERIOUS COMORBIDITY WITH BODY MASS INDEX (BMI) OF 35.0 TO 35.9 IN ADULT, UNSPECIFIED OBESITY TYPE: Primary | ICD-10-CM

## 2024-09-25 PROCEDURE — 3008F BODY MASS INDEX DOCD: CPT | Performed by: ADVANCED PRACTICE MIDWIFE

## 2024-09-25 PROCEDURE — 99213 OFFICE O/P EST LOW 20 MIN: CPT | Performed by: FAMILY MEDICINE

## 2024-09-25 PROCEDURE — 1036F TOBACCO NON-USER: CPT | Performed by: ADVANCED PRACTICE MIDWIFE

## 2024-09-25 PROCEDURE — 99395 PREV VISIT EST AGE 18-39: CPT | Performed by: ADVANCED PRACTICE MIDWIFE

## 2024-09-25 PROCEDURE — 3008F BODY MASS INDEX DOCD: CPT | Performed by: FAMILY MEDICINE

## 2024-09-25 ASSESSMENT — ENCOUNTER SYMPTOMS
DEPRESSION: 0
LOSS OF SENSATION IN FEET: 0
OCCASIONAL FEELINGS OF UNSTEADINESS: 0

## 2024-09-25 ASSESSMENT — PATIENT HEALTH QUESTIONNAIRE - PHQ9
2. FEELING DOWN, DEPRESSED OR HOPELESS: NOT AT ALL
1. LITTLE INTEREST OR PLEASURE IN DOING THINGS: NOT AT ALL
SUM OF ALL RESPONSES TO PHQ9 QUESTIONS 1 AND 2: 0

## 2024-09-25 NOTE — PROGRESS NOTES
"GYNECOLOGY PROGRESS NOTE        CC:  annual exam   Chief Complaint   Patient presents with    Annual Exam     Lita Oconnor is here today for annual exam and is an established patient.    Complaints:  LMP: none with nexplanon  LAST PAP: 11/2022 neg   ABN PAP HX: no   CONTRACEPTION: nexplanon  SEXUAL ACTIVITY: yes, denies pain with intercourse  STI SCREENING: no  URINARY: no        HPI:  pt presents for annual exam. Denies abnormal uterine bleeding, discharge, and pain. Pt reports small pimple on her r. labia majora. Pt doing well on her Nexplanon, no longer gets a period. Due for exchange in 12/25.       ROS:  GYN - see HPI        PHYSICAL EXAM:  /72 (BP Location: Left arm, Patient Position: Sitting, BP Cuff Size: Adult)   Ht 1.727 m (5' 8\")   Wt 107 kg (235 lb 6.4 oz)   BMI 35.79 kg/m²   GEN:  A&O, NAD  URO:  normal urethra, bladder NT  GYN:  normal vulva and perineum. Small infected sebaceous gland on r. labia majora  Breast: no masses palpated  PSYCH:  normal affect, non-anxious      IMPRESSION/PLAN:  A: normal GYN exam   P: follow-up in 1 year. Nexplanon due for removal and reinsertion if desired 12/25. PAP due 12/25 as well.   Problem List Items Addressed This Visit    None    Follow-up in one year.      Vero Monsivais RN    I saw and evaluated the patient. I personally obtained the key and critical portions of the history and physical exam or was physically present for key and critical portions performed by the student. I reviewed the student's documentation and discussed the patient with the student. I agree with the student's medical decision making as documented in the note.      Tisha Cloud CNM    "

## 2024-09-25 NOTE — PROGRESS NOTES
"Subjective   Patient ID: Lita Oconnor is a 29 y.o. female who presents for Med Management.    HPI     Last OV: 8/24/24    Pt is on compounded semaglutide for weight loss.    Pt states she experiences some mild nausea but feels she is tolerating the medication well.    Continues to work on appropriate dietary changes for caloric reduction and increased activity.       No other concerns.    Review of Systems  Constitutional: Patient denies any fever, chills, loss of appetite, or unexplained weight loss.  Cardiovascular: Denies any chest pain, shortness of breath with exertion, tachycardia, palpitations.  Respiratory: Patient denies any cough, shortness of breath, or wheezing.    Objective   /58 (BP Location: Left arm, Patient Position: Sitting, BP Cuff Size: Large adult)   Pulse 87   Temp 36.7 °C (98 °F) (Temporal)   Resp 16   Ht 1.727 m (5' 8\")   Wt 107 kg (236 lb)   SpO2 97%   BMI 35.88 kg/m²     Physical Exam  Gen. Appearance: Alert and cooperative, no acute distress, well-developed/well-nourished, obese female.     Neck: Supple and without adenopathy or rigidity. There is no JVD at 90° and no carotid bruits are noted.  Cardiovascular: Heart has a regular rate and rhythm without murmur or ectopy.  Respiratory: Lungs are clear to auscultation bilaterally with good air exchange.    Assessment/Plan   1. Class 2 obesity without serious comorbidity with body mass index (BMI) of 35.0 to 35.9 in adult, unspecified obesity type  Tolerating medication well.  Will increase the semaglutide to 0.6 mg subcutaneously once per week.    Rx sent to [facility] in [place].       Follow up in 1 month for weight check.      Scribe Attestation  By signing my name below, I, Alfredo Darnell   attest that this documentation has been prepared under the direction and in the presence of Donaldo Abraham DO.       "

## 2024-09-25 NOTE — PATIENT INSTRUCTIONS
We will increase the dose of semaglutide to 0.6 mg once weekly dose.    Follow up in 4 weeks to recheck.    It was a pleasure to see you today. Thank you for choosing us for your health care needs.    If you have lab or other testing completed and have not been informed of results within one week, please call the office for your results.    If you haven't done so, consider signing up for [facility] [employer], the [facility] personal health record. Ask the staff how you can get started.

## 2024-10-08 ENCOUNTER — TELEPHONE (OUTPATIENT)
Dept: PRIMARY CARE | Facility: CLINIC | Age: 29
End: 2024-10-08
Payer: COMMERCIAL

## 2024-10-08 NOTE — TELEPHONE ENCOUNTER
I re faxed the form that has 0.6 mg that was never received. She will need an appt for 1 month no available slots. Only avaible is Saturday at 10 am would that be 1 month. She should be finished with her current dose 0.3 this week and should be starting the 0.6 next week.

## 2024-10-08 NOTE — TELEPHONE ENCOUNTER
Patient stated Dr. Abraham was going to send Mounjaro to [facility] and they haven't received.  Patient would like update on medication.  Also patient states she needs to be seen back with Dr. Abraham soon.  Please advise when it looks like he is booked out to end December.

## 2024-10-21 ENCOUNTER — APPOINTMENT (OUTPATIENT)
Dept: PRIMARY CARE | Facility: CLINIC | Age: 29
End: 2024-10-21
Payer: COMMERCIAL

## 2024-10-21 VITALS
DIASTOLIC BLOOD PRESSURE: 78 MMHG | TEMPERATURE: 97.9 F | HEART RATE: 81 BPM | SYSTOLIC BLOOD PRESSURE: 113 MMHG | HEIGHT: 68 IN | BODY MASS INDEX: 34.84 KG/M2 | WEIGHT: 229.9 LBS | OXYGEN SATURATION: 95 %

## 2024-10-21 DIAGNOSIS — E66.812 CLASS 2 OBESITY WITHOUT SERIOUS COMORBIDITY WITH BODY MASS INDEX (BMI) OF 36.0 TO 36.9 IN ADULT, UNSPECIFIED OBESITY TYPE: Primary | ICD-10-CM

## 2024-10-21 PROCEDURE — 99213 OFFICE O/P EST LOW 20 MIN: CPT | Performed by: FAMILY MEDICINE

## 2024-10-21 PROCEDURE — 3008F BODY MASS INDEX DOCD: CPT | Performed by: FAMILY MEDICINE

## 2024-10-21 NOTE — PATIENT INSTRUCTIONS
We will increase the Semaglutide to the next dose.    Follow up in 4 weeks.    It was a pleasure to see you today. Thank you for choosing us for your health care needs.    If you have lab or other testing completed and have not been informed of results within one week, please call the office for your results.    If you haven't done so, consider signing up for [facility] [employer], the [facility] personal health record. Ask the staff how you can get started.

## 2024-10-21 NOTE — PROGRESS NOTES
"Subjective   Patient ID: Lita Oconnor is a 29 y.o. female who presents for Follow-up.    HPI     Pt states she had experienced a bit of nausea for two days after the 0.6mg semaglutide injection.   She states no other symptoms or side effects since then.    Has lost 7 pounds over the past 1 month.    No recent BW    Pt is on compounded semaglutide at a dose of 0.6 mg once a week for weight loss.  She feels that she is tolerating the medication well  Patient takes second dose of semaglutide at the o.6 mg weekly dose on Thursday [10/24/24]  Continues to work on appropriate dietary changes for caloric reduction and increased activity.    No other concerns.      Review of Systems    Cardiovascular: Patient denies any chest pain, shortness of breath with exertion, tachycardia, palpitations, orthopnea, or paroxysmal nocturnal dyspnea.  Respiratory: Patient denies any cough, shortness breath, or wheezing.    Gastrointestinal: Patient denies any , vomiting, diarrhea, constipation, melena, hematochezia, or reflux symptoms.  Had some mild nausea with 1st injection of semaglutide at the 0.6 mg weekly dose.    Endocrinology: Denies any polyuria, polydipsia, polyphagia, or heat/cold intolerance.    Objective   /78   Pulse 81   Temp 36.6 °C (97.9 °F)   Ht 1.727 m (5' 8\")   Wt 104 kg (229 lb 14.4 oz)   SpO2 95%   BMI 34.96 kg/m²     Physical Exam    General Appearance: Alert and cooperative, in no acute distress, well-developed/well-nourished obese female  Neck: Supple and without adenopathy or rigidity. There is no JVD at 90° and no carotid bruits are noted. There is no thyromegaly, thyroid tenderness, or palpable thyroid nodules.  Heart: Regular rate and rhythm without murmur or ectopy.  Lungs: Clear to auscultation bilaterally with good air exchange.  Skin: Good turgor, moist, warm and without rashes or lesions.  Neurological exam: Alert and oriented ×3, no tremor, normal gait.  Extremities: No clubbing, " cyanosis, or edema  Psychiatric: Appropriate mood and affect, good insight and judgment, no delusions or thought disorders, no suicidal or homicidal ideation    Assessment/Plan     1. Class 2 obesity without serious comorbidity with body mass index (BMI) of 36.0 to 36.9 in adult, unspecified obesity type (Primary)  We will increase the Semaglutide to 1.2 mg per week.  RX faxed to [facility] pharmacy.  Continue to work on caloric restriction and increased activity.      Follow up in one month or sooner if needed.      Scribe Attestation  By signing my name below, ISabra Scribe   attest that this documentation has been prepared under the direction and in the presence of Donaldo Abraham DO.

## 2024-11-25 ENCOUNTER — APPOINTMENT (OUTPATIENT)
Dept: PRIMARY CARE | Facility: CLINIC | Age: 29
End: 2024-11-25
Payer: COMMERCIAL

## 2024-11-25 VITALS
HEART RATE: 72 BPM | DIASTOLIC BLOOD PRESSURE: 86 MMHG | OXYGEN SATURATION: 96 % | BODY MASS INDEX: 33.34 KG/M2 | TEMPERATURE: 98.2 F | WEIGHT: 220 LBS | HEIGHT: 68 IN | SYSTOLIC BLOOD PRESSURE: 122 MMHG

## 2024-11-25 DIAGNOSIS — E66.812 CLASS 2 OBESITY WITHOUT SERIOUS COMORBIDITY WITH BODY MASS INDEX (BMI) OF 36.0 TO 36.9 IN ADULT, UNSPECIFIED OBESITY TYPE: Primary | ICD-10-CM

## 2024-11-25 PROCEDURE — 99213 OFFICE O/P EST LOW 20 MIN: CPT | Performed by: FAMILY MEDICINE

## 2024-11-25 PROCEDURE — 3008F BODY MASS INDEX DOCD: CPT | Performed by: FAMILY MEDICINE

## 2024-11-25 PROCEDURE — 1036F TOBACCO NON-USER: CPT | Performed by: FAMILY MEDICINE

## 2024-11-25 NOTE — PATIENT INSTRUCTIONS
Follow up in 4 weeks.    We increased the dose of semaglutide to 1.8 mg weekly as dicussed.    It was a pleasure to see you today. Thank you for choosing us for your health care needs.    If you have lab or other testing completed and have not been informed of results within one week, please call the office for your results.    If you haven't done so, consider signing up for [facility] [employer], the [facility] personal health record. Ask the staff how you can get started.

## 2024-11-25 NOTE — PROGRESS NOTES
"Subjective   Patient ID: Lita Oconnor is a 29 y.o. female who presents for Follow-up.    HPI     Lost 9 pounds over the past 1 month.  Tolerating the semaglutide well at this point in time.         Pt is on compounded semaglutide at a dose of 1.2 mg once a week for weight loss.  She feels that she is tolerating the medication well  Continues to work on appropriate dietary changes for caloric reduction and increased activity.    Patient would like to increase the semaglutide to the next dose to continue her weight loss.    Review of Systems    Cardiovascular: Patient denies any chest pain, shortness of breath with exertion, tachycardia, palpitations, orthopnea, or paroxysmal nocturnal dyspnea.  Respiratory: Patient denies any cough, shortness breath, or wheezing.  Gastrointestinal: Patient denies any nausea, vomiting, diarrhea, constipation, melena, hematochezia, or reflux symptoms  Skin: Denies any rashes or skin lesions  Neurology: Patient denies any new motor or sensory losses. Denies any numbness, tingling, weakness, and incoordination of the extremities. Patient also denies any tremor, seizures, or gait instability.  Endocrinology: Denies any polyuria, polydipsia, polyphagia, or heat/cold intolerance.  Psychiatric: She denies any depression, anxiety, or suicidal/homicidal ideation.    Objective   /86   Pulse 72   Temp 36.8 °C (98.2 °F) (Temporal)   Ht 1.727 m (5' 8\")   Wt 99.8 kg (220 lb)   SpO2 96%   BMI 33.45 kg/m²     Physical Exam    General Appearance: Alert and cooperative, in no acute distress, well-developed/well-nourished obese female  Neck: Supple and without adenopathy or rigidity. There is no JVD at 90° and no carotid bruits are noted. There is no thyromegaly, thyroid tenderness, or palpable thyroid nodules.  Heart: Regular rate and rhythm without murmur or ectopy.  Lungs: Clear to auscultation bilaterally with good air exchange.  Skin: Good turgor, moist, warm and without rashes or " lesions.  Neurological exam: Alert and oriented ×3, no tremor, normal gait.  Extremities: No clubbing, cyanosis, or edema  Psychiatric: Appropriate mood and affect, good insight and judgment, no delusions or thought disorders, no suicidal or homicidal ideation    Assessment/Plan     1. Class 2 obesity without serious comorbidity with body mass index (BMI) of 36.0 to 36.9 in adult, unspecified obesity type (Primary)  Increased the patient's dosage to 1.8 mg to be taken once a week.  RX faxed to [facility].  Advised patient to be mindful during the holiday season as her new dosage may lead to an increase in side effects if overeating.  Will continue to monitor.      Follow up in one month or sooner if needed.       Scribe Attestation  By signing my name below, ISabra Scribe   attest that this documentation has been prepared under the direction and in the presence of Donaldo Abraham DO.

## 2024-12-04 ENCOUNTER — TELEPHONE (OUTPATIENT)
Dept: PRIMARY CARE | Facility: CLINIC | Age: 29
End: 2024-12-04
Payer: COMMERCIAL

## 2024-12-04 NOTE — TELEPHONE ENCOUNTER
Dr Abraham pt calling in, Semaglutide script was sent to [facility] for an increase in dose. Pt is asking to stay on the dose before the increase for another month as she is experiencing intense diarrhea, nausea, and vomitting 24hr after injection. Can pt stay at dose prior to increase and if so [facility]  Pt can be reached at [phone]

## 2024-12-05 NOTE — TELEPHONE ENCOUNTER
We will continue on the 1.2 mg dose and a new RX will be faxed to [facility].    Advise patient.

## 2024-12-23 ENCOUNTER — APPOINTMENT (OUTPATIENT)
Dept: PRIMARY CARE | Facility: CLINIC | Age: 29
End: 2024-12-23
Payer: COMMERCIAL

## 2024-12-23 VITALS
TEMPERATURE: 98.1 F | WEIGHT: 212.8 LBS | OXYGEN SATURATION: 98 % | HEART RATE: 75 BPM | HEIGHT: 68 IN | DIASTOLIC BLOOD PRESSURE: 88 MMHG | BODY MASS INDEX: 32.25 KG/M2 | SYSTOLIC BLOOD PRESSURE: 121 MMHG

## 2024-12-23 DIAGNOSIS — E66.812 CLASS 2 OBESITY WITHOUT SERIOUS COMORBIDITY WITH BODY MASS INDEX (BMI) OF 36.0 TO 36.9 IN ADULT, UNSPECIFIED OBESITY TYPE: ICD-10-CM

## 2024-12-23 PROCEDURE — 3008F BODY MASS INDEX DOCD: CPT | Performed by: FAMILY MEDICINE

## 2024-12-23 PROCEDURE — 99213 OFFICE O/P EST LOW 20 MIN: CPT | Performed by: FAMILY MEDICINE

## 2024-12-23 ASSESSMENT — PATIENT HEALTH QUESTIONNAIRE - PHQ9
2. FEELING DOWN, DEPRESSED OR HOPELESS: NOT AT ALL
SUM OF ALL RESPONSES TO PHQ9 QUESTIONS 1 AND 2: 0
1. LITTLE INTEREST OR PLEASURE IN DOING THINGS: NOT AT ALL

## 2024-12-23 NOTE — PATIENT INSTRUCTIONS
Continue on the 1.2 mg weekly dose.      Follow up in 2 months.    It was a pleasure to see you today. Thank you for choosing us for your health care needs.    If you have lab or other testing completed and have not been informed of results within one week, please call the office for your results.    If you haven't done so, consider signing up for [facility] [employer], the [facility] personal health record. Ask the staff how you can get started.

## 2024-12-23 NOTE — PROGRESS NOTES
"Subjective   Patient ID: Lita Oconnor is a 29 y.o. female who presents for Follow-up.    HPI     Pt presents in office to follow up on semaglutide medication.     Pt says that her last dosage (1.8 mg), she says she had  been experiencing side effects. She said the first week she took it, she was very nauseous and was throwing up to 3 days per week. She says she was taking 0.9 mg of the 1.2 mg due to the nausea, which she says has been fine.    Pt has lost 8 pounds since last visit.     No other concerns at this time.     Review of Systems  Constitutional: Patient denies any fever, chills, loss of appetite, or unexplained weight loss.  Cardiovascular: Denies any chest pain, shortness of breath with exertion, tachycardia, palpitations.  Respiratory: Patient denies any cough, shortness of breath, or wheezing.    Objective   /88   Pulse 75   Temp 36.7 °C (98.1 °F)   Ht 1.727 m (5' 8\")   Wt 96.5 kg (212 lb 12.8 oz)   SpO2 98%   BMI 32.36 kg/m²     Physical Exam  Gen. Appearance: Alert and cooperative, no acute distress, well-developed/well-nourished, obese female.    Neck: Supple and without adenopathy or rigidity. There is no JVD at 90° and no carotid bruits are noted.  Cardiovascular: Heart has a regular rate and rhythm without murmur or ectopy.  Respiratory: Lungs are clear to auscultation bilaterally with good air exchange.    Assessment/Plan     1. Class 2 obesity without serious comorbidity with body mass index (BMI) of 36.0 to 36.9 in adult, unspecified obesity type (Primary)  She would like to remain on the 1.2 mg weekly dose due to increased nausea on the higher dose.   She reports no other side effects besides nausea.   She has lost 8 pounds since last visit.   Will refill her semaglutide 1.2 mg weekly injection.   Rx faxed to [facility].   Will follow up in 2 months.      Scribe Attestation  By signing my name below, IKaleigh , Alfredo   attest that this documentation has been " prepared under the direction and in the presence of Donaldo Abraham DO.

## 2025-01-22 ENCOUNTER — APPOINTMENT (OUTPATIENT)
Dept: PRIMARY CARE | Facility: CLINIC | Age: 30
End: 2025-01-22
Payer: COMMERCIAL

## 2025-02-12 ENCOUNTER — APPOINTMENT (OUTPATIENT)
Dept: PRIMARY CARE | Facility: CLINIC | Age: 30
End: 2025-02-12
Payer: COMMERCIAL

## 2025-02-12 VITALS
OXYGEN SATURATION: 96 % | DIASTOLIC BLOOD PRESSURE: 78 MMHG | BODY MASS INDEX: 30.36 KG/M2 | SYSTOLIC BLOOD PRESSURE: 112 MMHG | TEMPERATURE: 98.1 F | HEIGHT: 68 IN | WEIGHT: 200.3 LBS | HEART RATE: 78 BPM

## 2025-02-12 DIAGNOSIS — E66.811 CLASS 1 OBESITY WITHOUT SERIOUS COMORBIDITY WITH BODY MASS INDEX (BMI) OF 30.0 TO 30.9 IN ADULT, UNSPECIFIED OBESITY TYPE: ICD-10-CM

## 2025-02-12 PROCEDURE — 99213 OFFICE O/P EST LOW 20 MIN: CPT | Performed by: FAMILY MEDICINE

## 2025-02-12 PROCEDURE — 3008F BODY MASS INDEX DOCD: CPT | Performed by: FAMILY MEDICINE

## 2025-02-12 NOTE — PATIENT INSTRUCTIONS
Follow up in 1 month.    It was a pleasure to see you today. Thank you for choosing us for your health care needs.    If you have lab or other testing completed and have not been informed of results within one week, please call the office for your results.    If you haven't done so, consider signing up for [facility] [employer], the [facility] personal health record. Ask the staff how you can get started.

## 2025-02-12 NOTE — PROGRESS NOTES
"Subjective   Patient ID: Lita Oconnor is a 29 y.o. female who presents for Follow-up.    HPI     Declines flu and Covid shot     Patient has lost 12# since last visit.   Patient states she tolerates it well and with no noted side effects.   She states she has been taking the 1.2 mg weekly dose of semaglutide for a month and is doing well.        Review of Systems  Constitutional: Patient denies any fever, chills, loss of appetite, or unexplained weight loss.  Cardiovascular: Denies any chest pain, shortness of breath with exertion, tachycardia, palpitations.  Respiratory: Patient denies any cough, shortness of breath, or wheezing.    Objective   /78   Pulse 78   Temp 36.7 °C (98.1 °F) (Temporal)   Ht 1.727 m (5' 8\")   Wt 90.9 kg (200 lb 4.8 oz)   SpO2 96%   BMI 30.46 kg/m²     Physical Exam  Gen. Appearance: Alert and cooperative, no acute distress, well-developed/well-nourished obese female.  Neck: Supple and without adenopathy or rigidity. There is no JVD at 90° and no carotid bruits are noted.  Cardiovascular: Heart has a regular rate and rhythm without murmur or ectopy.  Respiratory: Lungs are clear to auscultation bilaterally with good air exchange.    Assessment/Plan   1. Class 1 obesity without serious comorbidity with body mass index (BMI) of 30.0 to 30.9 in adult, unspecified obesity type (Primary)   Patient is on 1.2 mg of semaglutide and is tolerating well with no noted side effects.   She has lost 12 pounds since last office visit.   She would like to stay at the current dose.   Will refill her semaglutide 1.2 mg weekly injection.   Rx faxed to [facility].   Follow up in 4 weeks for weight check.  Will likely need to consider a maintenance dose at that point.      Scribe Attestation  By signing my name below, I, Alfredo Darnell   attest that this documentation has been prepared under the direction and in the presence of Donaldo Abraham DO.    "

## 2025-03-12 ENCOUNTER — APPOINTMENT (OUTPATIENT)
Dept: PRIMARY CARE | Facility: CLINIC | Age: 30
End: 2025-03-12
Payer: COMMERCIAL

## 2025-03-12 VITALS
SYSTOLIC BLOOD PRESSURE: 122 MMHG | DIASTOLIC BLOOD PRESSURE: 88 MMHG | BODY MASS INDEX: 29.43 KG/M2 | HEART RATE: 88 BPM | TEMPERATURE: 98.1 F | OXYGEN SATURATION: 96 % | HEIGHT: 68 IN | WEIGHT: 194.2 LBS

## 2025-03-12 DIAGNOSIS — E66.3 OVERWEIGHT (BMI 25.0-29.9): ICD-10-CM

## 2025-03-12 PROCEDURE — 3008F BODY MASS INDEX DOCD: CPT | Performed by: FAMILY MEDICINE

## 2025-03-12 PROCEDURE — 99213 OFFICE O/P EST LOW 20 MIN: CPT | Performed by: FAMILY MEDICINE

## 2025-03-12 NOTE — PROGRESS NOTES
"Subjective   Patient ID: Lita Oconnor is a 29 y.o. female who presents for Follow-up.    HPI     Pt presents in office to follow up on semaglutide medication.  Weight is down 6 pounds since her last appt.  She reports loose stool, nausea as her only side effects.  She states these symptoms only occur 1-2 days after taking the injection.      Pt has also noticed some increased hair loss and is unsure why.   She states she has been stressed for the past month and is not sure if this is the cause.   She has been taking biotin and multivitamin to try to help with this.    No other concerns at this time.    Review of Systems  Constitutional: Patient denies any fever, chills, loss of appetite, or unexplained weight loss.  Cardiovascular: Patient denies any chest pain, shortness of breath with exertion, tachycardia, palpitations, orthopnea, or paroxysmal nocturnal dyspnea.  Respiratory: Patient denies any cough, shortness of breath, or wheezing.  Gastrointestinal: Patient denies any nausea, vomiting, diarrhea, constipation, melena, hematochezia, or reflux symptoms  Skin: Denies any rashes or skin lesions  Neurology: Patient denies any new motor or sensory losses. Denies any numbness, tingling, weakness, and incoordination of the extremities. Patient also denies any tremor, seizures, or gait instability.  Endocrinology: Denies any polyuria, polydipsia, polyphagia, or heat/cold intolerance.      Objective   /88   Pulse 88   Temp 36.7 °C (98.1 °F)   Ht 1.727 m (5' 8\")   Wt 88.1 kg (194 lb 3.2 oz)   SpO2 96%   BMI 29.53 kg/m²     Physical Exam  General Appearance: Alert and cooperative, in no acute distress, well-developed/well-nourished overweight female.  Neck: Supple and without adenopathy or rigidity. There is no JVD at 90° and no carotid bruits are noted. There is no thyromegaly, thyroid tenderness, or palpable thyroid nodules.  Heart: Regular rate and rhythm without murmur or ectopy.  Lungs: Clear to " auscultation bilaterally with good air exchange.  Skin: Good turgor, moist, warm and without rashes or lesions.  Neurological exam: Alert and oriented ×3, no tremor, normal gait.  Extremities: No clubbing, cyanosis, or edema      Assessment/Plan   Assessment & Plan  Overweight (BMI 25.0-29.9)  Patient is on 1.2 mg of semaglutide and is tolerating well.  She reports loose stool and nausea for one day after her injection.  She does not wish to stop the medication.  She has lost 6 pounds since last office visit.   She would like to stay at the current dose.   Will refill her semaglutide 1.2 mg weekly injection.   Rx faxed to [facility].    Follow up in 4 weeks for weight check.     She will continue taking her multivitamin and biotin daily to combat the hair loss.       Scribe Attestation  By signing my name below, IKaleigh , Alfredo   attest that this documentation has been prepared under the direction and in the presence of Donaldo Abraham DO.

## 2025-03-12 NOTE — PATIENT INSTRUCTIONS
Continue on the current dose of semaglutide.    Follow up in 4 weeks.    It was a pleasure to see you today. Thank you for choosing us for your health care needs.    If you have lab or other testing completed and have not been informed of results within one week, please call the office for your results.    If you haven't done so, consider signing up for [facility] [employer], the [facility] personal health record. Ask the staff how you can get started.

## 2025-04-15 ENCOUNTER — APPOINTMENT (OUTPATIENT)
Dept: PRIMARY CARE | Facility: CLINIC | Age: 30
End: 2025-04-15
Payer: COMMERCIAL

## 2025-04-15 VITALS
HEART RATE: 73 BPM | DIASTOLIC BLOOD PRESSURE: 84 MMHG | HEIGHT: 68 IN | OXYGEN SATURATION: 97 % | TEMPERATURE: 98.1 F | BODY MASS INDEX: 29.54 KG/M2 | WEIGHT: 194.9 LBS | SYSTOLIC BLOOD PRESSURE: 123 MMHG

## 2025-04-15 DIAGNOSIS — E66.3 OVERWEIGHT (BMI 25.0-29.9): ICD-10-CM

## 2025-04-15 PROCEDURE — 99213 OFFICE O/P EST LOW 20 MIN: CPT | Performed by: FAMILY MEDICINE

## 2025-04-15 PROCEDURE — 3008F BODY MASS INDEX DOCD: CPT | Performed by: FAMILY MEDICINE

## 2025-04-15 NOTE — PATIENT INSTRUCTIONS
We have increased the dose of semaglutide to the 1.8 mg per week as discussed.    Follow up in 4 week.    It was a pleasure to see you today. Thank you for choosing us for your health care needs.    If you have lab or other testing completed and have not been informed of results within one week, please call the office for your results.    If you haven't done so, consider signing up for [facility] [employer], the [facility] personal health record. Ask the staff how you can get started.

## 2025-04-15 NOTE — PROGRESS NOTES
Subjective   Patient ID: Lita Oconnor is a 29 y.o. female who presents for Follow-up.    HPI       Patient presents today for 4 week follow up for Weight loss management.   She is currently taking 1.2 mg of semaglutide.   Patient has not lost any weight this month.  Patient states fell of for a month.   She denies any noted side effects.   She would like to increase her dosage.   She wants to lose about 15 more pounds before her wedding in June 2025.         Last OV:  Pt presents in office to follow up on semaglutide medication.  Weight is down 6 pounds since her last appt.  She reports loose stool, nausea as her only side effects.  She states these symptoms only occur 1-2 days after taking the injection.      Pt has also noticed some increased hair loss and is unsure why.   She states she has been stressed for the past month and is not sure if this is the cause.   She has been taking biotin and multivitamin to try to help with this.     No other concerns at this time.    Review of Systems  Constitutional: Patient denies any fever, chills, loss of appetite, or unexplained weight loss.  Cardiovascular: Patient denies any chest pain, shortness of breath with exertion, tachycardia, palpitations, orthopnea, or paroxysmal nocturnal dyspnea.  Respiratory: Patient denies any cough, shortness of breath, or wheezing.  Gastrointestinal: Patient denies any nausea, vomiting, diarrhea, constipation, melena, hematochezia, or reflux symptoms  Skin: Denies any rashes or skin lesions  Neurology: Patient denies any new motor or sensory losses. Denies any numbness, tingling, weakness, and incoordination of the extremities. Patient also denies any tremor, seizures, or gait instability.  Endocrinology: Denies any polyuria, polydipsia, polyphagia, or heat/cold intolerance.      Objective   /84 (BP Location: Right arm, Patient Position: Sitting, BP Cuff Size: Adult long)   Pulse 73   Temp 36.7 °C (98.1 °F) (Temporal)   Ht  "1.727 m (5' 8\")   Wt 88.4 kg (194 lb 14.4 oz)   SpO2 97%   BMI 29.63 kg/m²     Physical Exam  General Appearance: Alert and cooperative, in no acute distress, well-developed/well-nourished overweight female.     Neck: Supple and without adenopathy or rigidity. There is no JVD at 90° and no carotid bruits are noted. There is no thyromegaly, thyroid tenderness, or palpable thyroid nodules.  Heart: Regular rate and rhythm without murmur or ectopy.  Lungs: Clear to auscultation bilaterally with good air exchange.  Skin: Good turgor, moist, warm and without rashes or lesions.  Neurological exam: Alert and oriented ×3, no tremor, normal gait.  Extremities: No clubbing, cyanosis, or edema      Assessment/Plan   Overweight (BMI 25.0-29.9):  Patient is on 1.2 mg of semaglutide and is tolerating well.  Denies any noted side effects.   Patient has not lost any weight this month.  Patient would like to increase the dose of the semaglutide.   Will increase her semaglutide to 1.8 mg subcutaneously weekly.   Rx faxed to [facility].    Follow up in 4 weeks for weight check.       Scribe Attestation  By signing my name below, I, Alfredo Darnell   attest that this documentation has been prepared under the direction and in the presence of Donaldo Abraham DO.       "

## 2025-05-19 ENCOUNTER — APPOINTMENT (OUTPATIENT)
Dept: PRIMARY CARE | Facility: CLINIC | Age: 30
End: 2025-05-19
Payer: COMMERCIAL

## 2025-05-19 VITALS
BODY MASS INDEX: 28.49 KG/M2 | HEART RATE: 83 BPM | DIASTOLIC BLOOD PRESSURE: 78 MMHG | OXYGEN SATURATION: 98 % | HEIGHT: 68 IN | WEIGHT: 188 LBS | SYSTOLIC BLOOD PRESSURE: 124 MMHG | TEMPERATURE: 97.1 F

## 2025-05-19 DIAGNOSIS — E66.3 OVERWEIGHT (BMI 25.0-29.9): ICD-10-CM

## 2025-05-19 PROCEDURE — 99213 OFFICE O/P EST LOW 20 MIN: CPT | Performed by: FAMILY MEDICINE

## 2025-05-19 PROCEDURE — 3008F BODY MASS INDEX DOCD: CPT | Performed by: FAMILY MEDICINE

## 2025-05-19 NOTE — PROGRESS NOTES
"Subjective   Patient ID: Lita Oconnor is a 29 y.o. female who presents for Follow-up.    HPI     Patient present today for 4 week follow up for weight loss management.  Patient has lost 6 pounds since last visit.   Continues to tolerate semaglutide well.  Denies any current GI side effects.  Continues to note some appetite suppression.         Review of Systems  Constitutional: Patient denies any fever, chills, loss of appetite, or unexplained weight loss.  Cardiovascular: Denies any chest pain, shortness of breath with exertion, tachycardia, palpitations.  Respiratory: Patient denies any cough, shortness of breath, or wheezing.      Objective   /78 (BP Location: Right arm, Patient Position: Sitting)   Pulse 83   Temp 36.2 °C (97.1 °F)   Ht 1.727 m (5' 8\")   Wt 85.3 kg (188 lb)   SpO2 98%   BMI 28.59 kg/m²     Physical Exam  Gen. appearance: Alert and cooperative, no acute distress, well-developed/well-nourished female.  Neck: Supple and without adenopathy or rigidity.  There is no JVD at 90° and no carotid bruits are noted.  Cardiovascular: Heart has a regular rate and rhythm without murmur or ectopy.  Respiratory: Lungs are clear to auscultation bilaterally with good air exchange.  Good respiratory effort and no accessory muscle use.    Assessment/Plan     Overweight (BMI 25.0-29.9):  Patient is on 1.8 mg of semaglutide and is tolerating well.  Denies any noted side effects.   We will continue with the current dose.  Rx faxed to [facility].    Follow up in 4 weeks for weight check.         "

## 2025-06-18 ENCOUNTER — APPOINTMENT (OUTPATIENT)
Dept: PRIMARY CARE | Facility: CLINIC | Age: 30
End: 2025-06-18
Payer: COMMERCIAL

## 2025-06-18 VITALS
HEART RATE: 85 BPM | HEIGHT: 68 IN | TEMPERATURE: 98.1 F | DIASTOLIC BLOOD PRESSURE: 81 MMHG | OXYGEN SATURATION: 98 % | SYSTOLIC BLOOD PRESSURE: 123 MMHG | WEIGHT: 186.7 LBS | BODY MASS INDEX: 28.3 KG/M2

## 2025-06-18 DIAGNOSIS — E66.3 OVERWEIGHT (BMI 25.0-29.9): Primary | ICD-10-CM

## 2025-06-18 DIAGNOSIS — Z23 NEED FOR VACCINATION: ICD-10-CM

## 2025-06-18 PROCEDURE — 3008F BODY MASS INDEX DOCD: CPT | Performed by: FAMILY MEDICINE

## 2025-06-18 PROCEDURE — 99213 OFFICE O/P EST LOW 20 MIN: CPT | Performed by: FAMILY MEDICINE

## 2025-06-18 NOTE — PATIENT INSTRUCTIONS
Follow up in 2 months.    Follow up with Travel med to discuss needed vaccines for the trip to [place].    It was a pleasure to see you today. Thank you for choosing us for your health care needs.    If you have lab or other testing completed and have not been informed of results within one week, please call the office for your results.    If you haven't done so, consider signing up for [facility] [employer], the [facility] personal health record. Ask the staff how you can get started.

## 2025-06-18 NOTE — PROGRESS NOTES
"Subjective   Patient ID: Lita Oconnor is a 30 y.o. female who presents for Follow-up.    HPI       Patient present today for 4 week follow up for weight loss management.  Patient has lost 2 pounds since last visit.   Continues to tolerate semaglutide well.  Denies any current GI side effects.  Continues to note some appetite suppression.    Patient is traveling to [place] in September and is unsure of what vaccines she would need.      Review of Systems  Constitutional: Patient denies any fever, chills, loss of appetite, or unexplained weight loss.  Cardiovascular: Denies any chest pain, shortness of breath with exertion, tachycardia, palpitations.  Respiratory: Patient denies any cough, shortness of breath, or wheezing.    Objective   /81 (BP Location: Right arm, Patient Position: Sitting, BP Cuff Size: Adult)   Pulse 85   Temp 36.7 °C (98.1 °F) (Temporal)   Ht 1.727 m (5' 8\")   Wt 84.7 kg (186 lb 11.2 oz)   SpO2 98%   BMI 28.39 kg/m²     Physical Exam  Gen. Appearance: Alert and cooperative, no acute distress, well-developed/well-nourished overweight female.    Neck: Supple and without adenopathy or rigidity. There is no JVD at 90° and no carotid bruits are noted.  Cardiovascular: Heart has a regular rate and rhythm without murmur or ectopy.  Respiratory: Lungs are clear to auscultation bilaterally with good air exchange.    Assessment/Plan     Overweight (BMI 25.0-29.9):  Patient is on 1.8 mg of semaglutide and is tolerating well.  Denies any noted side effects.   Continues to note appetite suppression.  Patient has lost 2 pounds since last office visit.   We will continue with the current dose.  Rx faxed to [facility].    Follow up in 2 months for weight check.     Need for vaccination:  Referred her to travel medicine as she will be traveling to [place] in September.        Scribe Attestation  By signing my name below, IKaleigh Scribe   attest that this documentation has been " prepared under the direction and in the presence of Donaldo Abraham DO.    Orders Placed This Encounter   Procedures    Referral to [facility]

## 2025-07-21 ENCOUNTER — TELEPHONE (OUTPATIENT)
Dept: PRIMARY CARE | Facility: CLINIC | Age: 30
End: 2025-07-21
Payer: COMMERCIAL

## 2025-07-28 ENCOUNTER — TELEPHONE (OUTPATIENT)
Dept: PRIMARY CARE | Facility: CLINIC | Age: 30
End: 2025-07-28
Payer: COMMERCIAL

## 2025-07-28 NOTE — TELEPHONE ENCOUNTER
Advise pt that the last RX faxed on 6/18/2025 did include 1 refill on the RX, so I'm not sure why [facility] is not providing that refill.    We will fax another RX today.

## 2025-07-28 NOTE — TELEPHONE ENCOUNTER
GD Patient is calling into to see why her Semaglutide has not been sent over to [facility].Please advise.

## 2025-07-29 ENCOUNTER — APPOINTMENT (OUTPATIENT)
Dept: INFECTIOUS DISEASES | Facility: CLINIC | Age: 30
End: 2025-07-29
Payer: COMMERCIAL

## 2025-07-29 DIAGNOSIS — Z71.84 COUNSELING FOR TRAVEL: Primary | ICD-10-CM

## 2025-07-29 PROCEDURE — 99202U03 TRAVEL CONSULT (U03): Performed by: INTERNAL MEDICINE

## 2025-07-29 RX ORDER — LOPERAMIDE HYDROCHLORIDE 2 MG/1
CAPSULE ORAL
Qty: 12 CAPSULE | Refills: 0 | Status: SHIPPED | OUTPATIENT
Start: 2025-07-29

## 2025-07-29 RX ORDER — CIPROFLOXACIN 500 MG/1
500 TABLET, FILM COATED ORAL 2 TIMES DAILY
Qty: 6 TABLET | Refills: 0 | Status: SHIPPED | OUTPATIENT
Start: 2025-07-29 | End: 2025-08-01

## 2025-07-29 RX ORDER — ATOVAQUONE AND PROGUANIL HYDROCHLORIDE 250; 100 MG/1; MG/1
1 TABLET, FILM COATED ORAL DAILY
Qty: 22 TABLET | Refills: 0 | Status: SHIPPED | OUTPATIENT
Start: 2025-07-29

## 2025-07-29 ASSESSMENT — PATIENT HEALTH QUESTIONNAIRE - PHQ9
SUM OF ALL RESPONSES TO PHQ9 QUESTIONS 1 & 2: 0
1. LITTLE INTEREST OR PLEASURE IN DOING THINGS: NOT AT ALL
2. FEELING DOWN, DEPRESSED OR HOPELESS: NOT AT ALL

## 2025-07-29 NOTE — PROGRESS NOTES
Tourist travel to [facility]  No YF risk  Mosquito precautions, malarone, deet  Food and water precautions- cipro and imodium, hep a and typhoid  Rabies precautions

## 2025-07-29 NOTE — PATIENT INSTRUCTIONS
You were seen today for your upcoming trip.    For your country specific issues, please refer back to the [employer] handouts supplied to you in the travel brochure folder that we provided to you at your visit.  These are updated daily, if necessary, by the reporting agencies, so are a valuable source of information for your trip.    This brief summary may not contain all of the items that we discussed today.  Please review the handouts given to you as well.    ** Please be sure to carry any medications with you in your carry-on luggage in the event that your luggage is delayed or lost during your travels.    ** For your trip, as we discussed, standard food and water precautions apply.     You should avoid drinking any water that is not bottled.  Alcoholic or carbonated beverages are safe to drink.  Any beverage that has been brought to a rolling boil for  3 minutes is also safe to drink (once it has cooled some).  Commercially purchased milk products that are boxed (may be on store shelves) or refrigerated, are pasteurized and therefore safe to drink as long as they are refrigerated after opening.  Juices that are prepared commercially (in boxes or individual bottles) are also safe to drink as they are pasteurized as well.  Avoid road-side juice vendors as the juice may be diluted with contaminated water or produced from unclean fruit.  Regarding food precautions, you want to make sure that meats are well cooked.   Avoid eating fresh fruits and vegetables raw with the skin intact.  Peel before eating.  Avoid salads due to possible contamination by contaminated water.  Avoid any unpasteurized cheeses (they typically are very white in appearance)    ** We recommend that you use insect repellant to help you prevent infections caused by biting insects such as mosquitoes, flies, ticks, fleas and chiggers.  This includes protection against Zika virus, Dengue virus, Chikungunya and Malaria, just to name a few.    For insect  "repellents that are applied directly to the skin, we recommend DEET containing repellants with a percentage between 20-40%.  Apply to skin exposed surfaces only, every 6-8 hours throughout the day.  I typically recommend applying before breakfast, lunch and dinner as these are natural breaks in your day.  Wash your hands after applying. Apply again in the evening.  You must reapply after bathing or swimming as it is washed away with water.  Apply after sunscreen products are applied. DEET containing repellants protect against all concerning insects with the exception of hornets, wasps or bees. Activity against ticks only lasts for 2 hours. For additional Tick precautions while hiking, tuck your pant legs into your socks as this will prevent ticks from crawling up your shoes / socks onto your legs while walking. Perform a \"tick check\" at least once daily, at the end of your day.  Check in the sporting goods areas of most stores for these products.  A recommended alternative, Picardin ,may also be used every 8 hours.  If you are unable to locate the product in stores, try on-line stores as well.      PERMETHRIN SPRAY  is an additional option and is for application to clothing and garments only.  This can be applied to hats, bandanas, socks, boots and any clothing items to prevent insect attention.  I can be applied before you leave and will remain active through many washes and for up to 6 weeks in unwashed clothing.  Read any packaging for full specifications. Apply in an open area as when wet, it has an odor. Once dry, it typically has no odor and does not stain clothing. Regular repellants should be applied to exposed skin however.  Permethrin may only be available on-line.     **  As a general safety procedure, we recommend that you scan a copy of your passport, any travel required documents (yellow fever vaccine card), and itinerary and email them to yourself.  Keep these in a special travel folder for reference " in the event that your travel documents are misplaced or stolen while abroad.  You should also leave a detailed copy of your itinerary with a friend or relative at home for reference as well.  If traveling for long periods, an international SIM card or global plan for your cellular service may be beneficial for communication. This list is not meant to be all inclusive.      **  Please review and understand any cultural aspects of the countries that you will be visiting to ensure that appropriate dress and behaviors are understood.  ENJOY YOUR TRIP (o:      **  Make sure to come back to complete any vaccine series that may have been started today.  This will ensure full vaccine efficacy and protection for future travel.    The key in travel medicine is prevention.   Minimizing mosquito bites can be very important- there are some infections we get from mosquitoes (for instance, Dengue) that can only be prevented by avoiding bites.  In some parts of the world mosquitoes my carry malaria and we prescribe medicine for this.   In a few areas mosquitoes carry yellow fever which we prevent with a vaccine.   It is also vital to be careful with food and water.  No water that is not bottled or boiled, and no food that is not cooked unless you peel it yourself.  We prescribe imodium and an antibiotic to take in case you go get diarrhea.  There are a couple of infections that we get from food and water (Hepatitis A and typhoid fever) that we can prevent with vaccine.   Remember that even minor dog bites or other animal bites in many parts of the world present a risk for rabies.  The good news is rabies is 100 % preventable if you get appropriate medical attention within 1-2 weeks of the bite.   And don't forget to wear seatbelts!

## 2025-08-25 ENCOUNTER — APPOINTMENT (OUTPATIENT)
Dept: PRIMARY CARE | Facility: CLINIC | Age: 30
End: 2025-08-25
Payer: COMMERCIAL

## 2025-09-03 ENCOUNTER — TELEPHONE (OUTPATIENT)
Dept: INFECTIOUS DISEASES | Facility: HOSPITAL | Age: 30
End: 2025-09-03
Payer: COMMERCIAL

## 2025-10-22 ENCOUNTER — APPOINTMENT (OUTPATIENT)
Dept: PRIMARY CARE | Facility: CLINIC | Age: 30
End: 2025-10-22
Payer: COMMERCIAL